# Patient Record
Sex: MALE | Race: WHITE | NOT HISPANIC OR LATINO | Employment: OTHER | URBAN - METROPOLITAN AREA
[De-identification: names, ages, dates, MRNs, and addresses within clinical notes are randomized per-mention and may not be internally consistent; named-entity substitution may affect disease eponyms.]

---

## 2017-04-28 DIAGNOSIS — M25.512 BILATERAL SHOULDER PAIN, UNSPECIFIED CHRONICITY: Primary | ICD-10-CM

## 2017-04-28 DIAGNOSIS — M25.511 BILATERAL SHOULDER PAIN, UNSPECIFIED CHRONICITY: Primary | ICD-10-CM

## 2017-05-01 ENCOUNTER — HOSPITAL ENCOUNTER (OUTPATIENT)
Dept: RADIOLOGY | Facility: HOSPITAL | Age: 37
Discharge: HOME | End: 2017-05-01
Attending: ORTHOPAEDIC SURGERY
Payer: COMMERCIAL

## 2017-05-01 ENCOUNTER — OFFICE VISIT (OUTPATIENT)
Dept: ORTHOPEDICS | Facility: CLINIC | Age: 37
End: 2017-05-01
Payer: COMMERCIAL

## 2017-05-01 VITALS
SYSTOLIC BLOOD PRESSURE: 139 MMHG | BODY MASS INDEX: 27.86 KG/M2 | HEART RATE: 92 BPM | DIASTOLIC BLOOD PRESSURE: 63 MMHG | WEIGHT: 199 LBS | HEIGHT: 71 IN

## 2017-05-01 DIAGNOSIS — S43.432A GLENOID LABRAL TEAR, LEFT, INITIAL ENCOUNTER: Primary | ICD-10-CM

## 2017-05-01 DIAGNOSIS — M25.511 BILATERAL SHOULDER PAIN, UNSPECIFIED CHRONICITY: ICD-10-CM

## 2017-05-01 DIAGNOSIS — M25.512 BILATERAL SHOULDER PAIN, UNSPECIFIED CHRONICITY: ICD-10-CM

## 2017-05-01 PROCEDURE — 99203 OFFICE O/P NEW LOW 30 MIN: CPT | Mod: S$GLB,,, | Performed by: ORTHOPAEDIC SURGERY

## 2017-05-01 PROCEDURE — 99999 PR PBB SHADOW E&M-EST. PATIENT-LVL III: CPT | Mod: PBBFAC,,, | Performed by: ORTHOPAEDIC SURGERY

## 2017-05-01 PROCEDURE — 73030 X-RAY EXAM OF SHOULDER: CPT | Mod: 26,50,, | Performed by: RADIOLOGY

## 2017-05-01 PROCEDURE — 1160F RVW MEDS BY RX/DR IN RCRD: CPT | Mod: S$GLB,,, | Performed by: ORTHOPAEDIC SURGERY

## 2017-05-01 PROCEDURE — 73030 X-RAY EXAM OF SHOULDER: CPT | Mod: TC,50,PN

## 2017-05-01 RX ORDER — FINASTERIDE 5 MG/1
TABLET, FILM COATED ORAL
COMMUNITY
Start: 2017-02-14

## 2017-05-01 NOTE — PROGRESS NOTES
History reviewed. No pertinent past medical history.    History reviewed. No pertinent surgical history.    Current Outpatient Prescriptions   Medication Sig    finasteride (PROSCAR) 5 mg tablet      No current facility-administered medications for this visit.        Review of patient's allergies indicates:  No Known Allergies    History reviewed. No pertinent family history.    Social History     Social History    Marital status: Single     Spouse name: N/A    Number of children: N/A    Years of education: N/A     Occupational History    Not on file.     Social History Main Topics    Smoking status: Never Smoker    Smokeless tobacco: Never Used    Alcohol use No    Drug use: No    Sexual activity: Not on file     Other Topics Concern    Not on file     Social History Narrative    No narrative on file       Chief Complaint:   Chief Complaint   Patient presents with    Shoulder Pain     bilateral       History of present illness: Is a 36-year-old left-hand-dominant male seen for bilateral shoulder pain.  Left is worse than the right.  Patient states that it's been bothering him for about 3-4 years.  Recalls injuring them doing some chest and bench press exercises.  Patient uses pain now when doing any kind of chest exercises.  Has some clicking on the left with rotation.  Pain as a 0 out of 10 but up to a 6 out of 10 with bench press, pushups, or flies.  Patient had an MRI with arthrogram when he lived in [place] but they never got the results.      Review of Systems:    Constitution: Negative for chills, fever, and sweats.  Negative for unexplained weight loss.    HENT:  Negative for headaches and blurry vision.    Cardiovascular:Negative for chest pain or irregular heart beat. Negative for hypertension.    Respiratory:  Negative for cough and shortness of breath.    Gastrointestinal: Negative for abdominal pain, heartburn, melena, nausea, and vomitting.    Genitourinary:  Negative bladder incontinence  and dysuria.    Musculoskeletal:  See HPI    Neurological: Negative for numbness.    Psychiatric/Behavioral: Negative for depression.  The patient is not nervous/anxious.      Endocrine: Negative for polyuria    Hematologic/Lymphatic: Negative for bleeding problem.  Does not bruise/bleed easily.    Skin: Negative for poor would healing and rash      Physical Examination:    Vital Signs:    Vitals:    05/01/17 1522   BP: 139/63   Pulse: 92       Body mass index is 27.75 kg/(m^2).    This a well-developed, well nourished patient in no acute distress.  They are alert and oriented and cooperative to examination.  Pt. walks without an antalgic gait.      Examination of the left shoulder shows no rashes or erythema. There are no masses, ecchymosis, or atrophy. The patient has full range of motion in forward flexion, external rotation, and internal rotation to the mid T-spine. The patient has - impingement signs. - Jackman's test. - Speeds test. Nontender to palpation over a.c. joint.  Positive pain with anterior labral stress.  Normal stability anteriorly, posteriorly, and negative sulcus sign. Passive range of motion: Forward flexion of 180°, external rotation at 90° of 90°, internal rotation of 50°, and external rotation at 0° of 50°. 2+ radial pulse. Intact axillary, radial, median and ulnar sensation. 5 out of 5 resisted forward flexion, external rotation, and negative lift off test.    Examination of the right shoulder shows no rashes or erythema. There are no masses, ecchymosis, or atrophy. The patient has full range of motion in forward flexion, external rotation, and internal rotation to the mid T-spine. The patient has - impingement signs. - Jackman's test. - Speeds test. Nontender to palpation over a.c. joint. Normal stability anteriorly, posteriorly, and negative sulcus sign. Passive range of motion: Forward flexion of 180°, external rotation at 90° of 90°, internal rotation of 50°, and external rotation at 0°  of 50°. 2+ radial pulse. Intact axillary, radial, median and ulnar sensation. 5 out of 5 resisted forward flexion, external rotation, and negative lift off test.    X-rays: X-rays of bilateral shoulders are ordered and reviewed which show no atypical findings     Assessment:: Likely left labral tear    Plan:  I reviewed the findings with the patient today.  I recommended getting the results of his MRI arthrogram so that he does not have to repeat it.    This note was created using Dragon voice recognition software that occasionally misinterpreted phrases or words.    Consult note is delivered via Epic messaging service.

## 2019-07-24 ENCOUNTER — APPOINTMENT (RX ONLY)
Dept: URBAN - METROPOLITAN AREA CLINIC 325 | Facility: CLINIC | Age: 39
Setting detail: DERMATOLOGY
End: 2019-07-24

## 2019-07-24 DIAGNOSIS — D485 NEOPLASM OF UNCERTAIN BEHAVIOR OF SKIN: ICD-10-CM

## 2019-07-24 DIAGNOSIS — D22 MELANOCYTIC NEVI: ICD-10-CM

## 2019-07-24 DIAGNOSIS — L81.4 OTHER MELANIN HYPERPIGMENTATION: ICD-10-CM

## 2019-07-24 DIAGNOSIS — B35.1 TINEA UNGUIUM: ICD-10-CM

## 2019-07-24 DIAGNOSIS — D18.0 HEMANGIOMA: ICD-10-CM

## 2019-07-24 PROBLEM — D48.5 NEOPLASM OF UNCERTAIN BEHAVIOR OF SKIN: Status: ACTIVE | Noted: 2019-07-24

## 2019-07-24 PROBLEM — D22.39 MELANOCYTIC NEVI OF OTHER PARTS OF FACE: Status: ACTIVE | Noted: 2019-07-24

## 2019-07-24 PROBLEM — K21.9 GASTRO-ESOPHAGEAL REFLUX DISEASE WITHOUT ESOPHAGITIS: Status: ACTIVE | Noted: 2019-07-24

## 2019-07-24 PROBLEM — D18.01 HEMANGIOMA OF SKIN AND SUBCUTANEOUS TISSUE: Status: ACTIVE | Noted: 2019-07-24

## 2019-07-24 PROCEDURE — ? PRESCRIPTION

## 2019-07-24 PROCEDURE — 11103 TANGNTL BX SKIN EA SEP/ADDL: CPT

## 2019-07-24 PROCEDURE — ? BIOPSY BY SHAVE METHOD

## 2019-07-24 PROCEDURE — 99203 OFFICE O/P NEW LOW 30 MIN: CPT | Mod: 25

## 2019-07-24 PROCEDURE — ? COUNSELING

## 2019-07-24 PROCEDURE — 11102 TANGNTL BX SKIN SINGLE LES: CPT

## 2019-07-24 RX ORDER — CICLOPIROX 8 %
SOLUTION, NON-ORAL TOPICAL QHS
Qty: 1 | Refills: 5 | Status: ERX | COMMUNITY
Start: 2019-07-24

## 2019-07-24 RX ADMIN — Medication: at 22:04

## 2019-07-24 ASSESSMENT — LOCATION DETAILED DESCRIPTION DERM
LOCATION DETAILED: RIGHT INFERIOR UPPER BACK
LOCATION DETAILED: RIGHT MEDIAL UPPER BACK
LOCATION DETAILED: LEFT MEDIAL MALAR CHEEK
LOCATION DETAILED: LEFT INFERIOR CENTRAL MALAR CHEEK
LOCATION DETAILED: RIGHT DORSAL GREAT TOE
LOCATION DETAILED: LEFT INFERIOR LATERAL MIDBACK
LOCATION DETAILED: LEFT DORSAL GREAT TOE
LOCATION DETAILED: RIGHT INFERIOR FOREHEAD

## 2019-07-24 ASSESSMENT — LOCATION SIMPLE DESCRIPTION DERM
LOCATION SIMPLE: RIGHT UPPER BACK
LOCATION SIMPLE: RIGHT GREAT TOE
LOCATION SIMPLE: RIGHT FOREHEAD
LOCATION SIMPLE: LEFT CHEEK
LOCATION SIMPLE: LEFT LOWER BACK
LOCATION SIMPLE: LEFT GREAT TOE

## 2019-07-24 ASSESSMENT — LOCATION ZONE DERM
LOCATION ZONE: TRUNK
LOCATION ZONE: FACE
LOCATION ZONE: TOE

## 2019-07-24 NOTE — PROCEDURE: BIOPSY BY SHAVE METHOD
Detail Level: Detailed
Biopsy Method: Dermablade
Billing Type: Third-Party Bill
Hemostasis: Electrocautery
Silver Nitrate Text: The wound bed was treated with silver nitrate after the biopsy was performed.
Render Path Notes In Note?: No
Was A Bandage Applied: Yes
Additional Anesthesia Volume In Cc (Will Not Render If 0): 0
Cryotherapy Text: The wound bed was treated with cryotherapy after the biopsy was performed.
Lab Facility: [id number]
Anesthesia Type: 1% lidocaine with 1:100,000 epinephrine and 408mcg clindamycin/ml and a 1:10 solution of 8.4% sodium bicarbonate
Post-Care Instructions: I reviewed with the patient in detail post-care instructions. Patient is to continue applying vaseline/aquaphor twice daily until wound heals.
Curettage Text: The wound bed was treated with curettage after the biopsy was performed.
Lab: -60
Depth Of Biopsy: dermis
Path Notes (To The Dermatopathologist): evaluate margins
Consent: Written consent was obtained and risks were reviewed including but not limited to scarring, infection, bleeding, scabbing, incomplete removal, nerve damage and allergy to anesthesia.
Electrodesiccation Text: The wound bed was treated with electrodesiccation after the biopsy was performed.
Notification Instructions: Patient will be notified of biopsy results. However, patient instructed to call the office if not contacted within 2 weeks.
Wound Care: Petrolatum
Size Of Lesion In Cm: 0.3
Electrodesiccation And Curettage Text: The wound bed was treated with electrodesiccation and curettage after the biopsy was performed.
Dressing: Band-Aid
Type Of Destruction Used: Curettage
Anesthesia Volume In Cc (Will Not Render If 0): 1
Biopsy Type: H and E
Size Of Lesion In Cm: 1.5

## 2019-12-05 ENCOUNTER — RX ONLY (OUTPATIENT)
Age: 39
Setting detail: RX ONLY
End: 2019-12-05

## 2019-12-05 RX ORDER — CICLOPIROX 80 MG/ML
SOLUTION TOPICAL QHS
Qty: 1 | Refills: 2 | Status: ERX